# Patient Record
Sex: MALE | URBAN - NONMETROPOLITAN AREA
[De-identification: names, ages, dates, MRNs, and addresses within clinical notes are randomized per-mention and may not be internally consistent; named-entity substitution may affect disease eponyms.]

---

## 2021-01-12 ENCOUNTER — APPOINTMENT (OUTPATIENT)
Dept: URBAN - NONMETROPOLITAN AREA CLINIC 50 | Age: 61
Setting detail: DERMATOLOGY
End: 2021-01-12

## 2021-01-12 VITALS — WEIGHT: 240 LBS | HEIGHT: 73 IN

## 2021-01-12 DIAGNOSIS — L72.8 OTHER FOLLICULAR CYSTS OF THE SKIN AND SUBCUTANEOUS TISSUE: ICD-10-CM

## 2021-01-12 PROCEDURE — OTHER COUNSELING: OTHER

## 2021-01-12 PROCEDURE — OTHER INCISION AND DRAINAGE: OTHER

## 2021-01-12 PROCEDURE — 10060 I&D ABSCESS SIMPLE/SINGLE: CPT

## 2021-01-12 ASSESSMENT — LOCATION ZONE DERM: LOCATION ZONE: NECK

## 2021-01-12 ASSESSMENT — LOCATION SIMPLE DESCRIPTION DERM: LOCATION SIMPLE: POSTERIOR NECK

## 2021-01-12 ASSESSMENT — LOCATION DETAILED DESCRIPTION DERM: LOCATION DETAILED: MID POSTERIOR NECK

## 2021-01-12 NOTE — PROCEDURE: INCISION AND DRAINAGE
Consent was obtained and risks were reviewed including but not limited to delayed wound healing, infection, need for multiple I and D's, and pain.
Epidermal Closure: simple interrupted
Curette Text (Optional): After the contents were expressed a curette was used to partially remove the cyst wall.
Method: 15 blade
Lesion Type: Cyst
Post-Care Instructions: I reviewed with the patient in detail post-care instructions. Patient should keep wound covered and call the office should any redness, pain, swelling or worsening occur.
Dressing: dry sterile dressing
Preparation Text: The area was prepped in the usual clean fashion.
Include Sutures?: No
Detail Level: Simple
Curette: Yes
Wound Care: Petrolatum
Size Of Lesion In Cm (Optional But May Be Required For Some Insurances): 0
Epidermal Sutures: 4-0 Ethilon
Anesthesia Type: 1% lidocaine with epinephrine
Suture Text: The incision was partially closed with

## 2021-06-28 ENCOUNTER — APPOINTMENT (OUTPATIENT)
Dept: URBAN - METROPOLITAN AREA CLINIC 232 | Age: 61
Setting detail: DERMATOLOGY
End: 2021-06-28

## 2021-06-28 DIAGNOSIS — K13.0 DISEASES OF LIPS: ICD-10-CM

## 2021-06-28 PROCEDURE — OTHER TREATMENT REGIMEN: OTHER

## 2021-06-28 PROCEDURE — OTHER COUNSELING: OTHER

## 2021-06-28 PROCEDURE — OTHER PRESCRIPTION: OTHER

## 2021-06-28 PROCEDURE — OTHER MIPS QUALITY: OTHER

## 2021-06-28 PROCEDURE — 99212 OFFICE O/P EST SF 10 MIN: CPT

## 2021-06-28 RX ORDER — HYDROCORTISONE 25 MG/G
CREAM TOPICAL BID
Qty: 1 | Refills: 0 | Status: ERX | COMMUNITY
Start: 2021-06-28

## 2021-06-28 NOTE — PROCEDURE: MIPS QUALITY
Additional Notes: The patient was instructed to bring a list of medications to his next appointment.
Detail Level: Detailed
Quality 226: Preventive Care And Screening: Tobacco Use: Screening And Cessation Intervention: Patient screened for tobacco use and is an ex/non-smoker
Quality 130: Documentation Of Current Medications In The Medical Record: Current Medications with Name, Dosage, Frequency, or Route not Documented, Reason not Given

## 2021-06-28 NOTE — PROCEDURE: TREATMENT REGIMEN
Initiate Treatment: hydrocortisone 2.5 % topical cream BID\\nApply to lips twice daily up to 2 weeks.
Initiate Treatment: Hydrocortisone 2.5% ointment, apply thin film to lips twice daily for 2 weeks
Detail Level: Zone

## 2021-07-12 ENCOUNTER — APPOINTMENT (OUTPATIENT)
Dept: URBAN - METROPOLITAN AREA CLINIC 232 | Age: 61
Setting detail: DERMATOLOGY
End: 2021-07-12

## 2021-07-12 DIAGNOSIS — K13.0 DISEASES OF LIPS: ICD-10-CM

## 2021-07-12 PROCEDURE — 99212 OFFICE O/P EST SF 10 MIN: CPT

## 2021-07-12 PROCEDURE — OTHER MEDICATION COUNSELING: OTHER

## 2021-07-12 PROCEDURE — OTHER ADDITIONAL NOTES: OTHER

## 2021-07-12 PROCEDURE — OTHER PRESCRIPTION: OTHER

## 2021-07-12 PROCEDURE — OTHER COUNSELING: OTHER

## 2021-07-12 PROCEDURE — OTHER MIPS QUALITY: OTHER

## 2021-07-12 RX ORDER — TRIAMCINOLONE ACETONIDE 1 MG/G
OINTMENT TOPICAL
Qty: 1 | Refills: 0 | Status: ERX | COMMUNITY
Start: 2021-07-12

## 2021-07-12 NOTE — PROCEDURE: ADDITIONAL NOTES
Additional Notes: Per patient 60% better but plateaued
Detail Level: Detailed
Render Risk Assessment In Note?: no

## 2021-07-12 NOTE — PROCEDURE: MEDICATION COUNSELING
Xelronakz Pregnancy And Lactation Text: This medication is Pregnancy Category D and is not considered safe during pregnancy.  The risk during breast feeding is also uncertain.

## 2021-07-26 ENCOUNTER — APPOINTMENT (OUTPATIENT)
Dept: URBAN - METROPOLITAN AREA CLINIC 234 | Age: 61
Setting detail: DERMATOLOGY
End: 2021-07-26

## 2021-07-26 DIAGNOSIS — L21.8 OTHER SEBORRHEIC DERMATITIS: ICD-10-CM

## 2021-07-26 DIAGNOSIS — K13.0 DISEASES OF LIPS: ICD-10-CM

## 2021-07-26 PROCEDURE — OTHER PRESCRIPTION: OTHER

## 2021-07-26 PROCEDURE — OTHER RECOMMENDATIONS: OTHER

## 2021-07-26 PROCEDURE — OTHER COUNSELING: OTHER

## 2021-07-26 PROCEDURE — 99213 OFFICE O/P EST LOW 20 MIN: CPT

## 2021-07-26 RX ORDER — KETOCONAZOLE 20 MG/G
CREAM TOPICAL BID
Qty: 1 | Refills: 1 | Status: ERX | COMMUNITY
Start: 2021-07-26

## 2021-07-26 ASSESSMENT — LOCATION DETAILED DESCRIPTION DERM
LOCATION DETAILED: LEFT UPPER CUTANEOUS LIP
LOCATION DETAILED: LEFT SUPERIOR MEDIAL BUCCAL CHEEK
LOCATION DETAILED: LEFT SUPERIOR VERMILION LIP
LOCATION DETAILED: LEFT INFERIOR VERMILION LIP
LOCATION DETAILED: RIGHT ORAL COMMISSURE
LOCATION DETAILED: RIGHT INFERIOR VERMILION LIP
LOCATION DETAILED: LEFT LOWER CUTANEOUS LIP

## 2021-07-26 ASSESSMENT — LOCATION SIMPLE DESCRIPTION DERM
LOCATION SIMPLE: RIGHT LIP
LOCATION SIMPLE: LEFT LIP
LOCATION SIMPLE: LEFT CHEEK

## 2021-07-26 ASSESSMENT — LOCATION ZONE DERM
LOCATION ZONE: LIP
LOCATION ZONE: FACE

## 2021-07-26 NOTE — PROCEDURE: RECOMMENDATIONS
Recommendation Preamble: The following recommendations were made during the visit:
Detail Level: Zone
Render Risk Assessment In Note?: no
Recommendations (Free Text): Apply Aquaphor multiple times daily
